# Patient Record
Sex: FEMALE | Race: WHITE | NOT HISPANIC OR LATINO | Employment: UNEMPLOYED | URBAN - METROPOLITAN AREA
[De-identification: names, ages, dates, MRNs, and addresses within clinical notes are randomized per-mention and may not be internally consistent; named-entity substitution may affect disease eponyms.]

---

## 2022-07-24 ENCOUNTER — APPOINTMENT (OUTPATIENT)
Dept: CT IMAGING | Facility: HOSPITAL | Age: 67
End: 2022-07-24

## 2022-07-24 ENCOUNTER — HOSPITAL ENCOUNTER (EMERGENCY)
Facility: HOSPITAL | Age: 67
Discharge: HOME | End: 2022-07-24
Attending: EMERGENCY MEDICINE | Admitting: EMERGENCY MEDICINE

## 2022-07-24 VITALS
RESPIRATION RATE: 18 BRPM | HEART RATE: 90 BPM | HEIGHT: 65 IN | DIASTOLIC BLOOD PRESSURE: 98 MMHG | BODY MASS INDEX: 27.49 KG/M2 | OXYGEN SATURATION: 94 % | TEMPERATURE: 98 F | WEIGHT: 165 LBS | SYSTOLIC BLOOD PRESSURE: 144 MMHG

## 2022-07-24 DIAGNOSIS — H53.9 VISUAL DISTURBANCE OF ONE EYE: Primary | ICD-10-CM

## 2022-07-24 PROCEDURE — 99283 EMERGENCY DEPT VISIT LOW MDM: CPT

## 2022-07-24 PROCEDURE — 70450 CT HEAD/BRAIN W/O DYE: CPT

## 2022-07-24 NOTE — ED PROVIDER NOTES
"Subjective     History provided by:  Patient    History of Present Illness    · Chief complaint: Visual disturbance    · Location: Left eye    · Quality/Severity: The patient states it looks like \"an oil slick\" in the vision of the left eye.  She reports seeing black spots in her visual fields and occasional black line that goes across her visual field.    · Timing/Onset: Started an hour prior to arrival.    · Modifying Factors: The patient is unaware of any aggravating or relieving factors.    · Associated symptoms: She has a slight left temporal headache.    · Narrative: The patient is a 66-year-old white female who states an hour prior to arrival she developed a visual disturbance involving her left eye.  She states that she is seeing constant black spots/dots in her vision in the left eye.  She also reports an occasional black line going across her visual field of her left eye.  The right eye is normal.  The patient is status post bilateral lens replacements for cataracts performed at the [facility] in [place] a year ago.  The patient is convinced that one of her lenses must have \"slipped\".  She does have a history of a subarachnoid hemorrhage in the past.  Her only complaint a headache is a slight left temporal headache.    Review of Systems   Constitutional: Negative for activity change, appetite change, chills, diaphoresis, fatigue and fever.   HENT: Negative for congestion, dental problem, ear pain, hearing loss, mouth sores, postnasal drip, rhinorrhea, sinus pressure, sore throat and voice change.    Eyes: Positive for visual disturbance. Negative for photophobia, pain, discharge, redness and itching.   Respiratory: Negative for cough, chest tightness, shortness of breath, wheezing and stridor.    Cardiovascular: Negative for chest pain, palpitations and leg swelling.   Gastrointestinal: Negative for abdominal pain, diarrhea, nausea and vomiting.   Genitourinary: Negative for difficulty urinating, " "dysuria, flank pain, frequency, hematuria and urgency.   Musculoskeletal: Negative for arthralgias, back pain, gait problem, joint swelling, myalgias, neck pain and neck stiffness.   Skin: Negative for color change and rash.   Neurological: Positive for headaches. Negative for dizziness, tremors, seizures, syncope, facial asymmetry, speech difficulty, weakness, light-headedness and numbness.   Hematological: Negative for adenopathy.   Psychiatric/Behavioral: Negative.  Negative for confusion and decreased concentration. The patient is not nervous/anxious.      History reviewed. No pertinent past medical history.  /98   Pulse 90   Temp 98 °F (36.7 °C) (Oral)   Resp 18   Ht 165.1 cm (65\")   Wt 74.8 kg (165 lb)   SpO2 94%   BMI 27.46 kg/m²     History reviewed. No pertinent past medical history.  Labs Reviewed - No data to display    No Known Allergies    History reviewed. No pertinent surgical history.    History reviewed. No pertinent family history.    Social History     Socioeconomic History   • Marital status:    Tobacco Use   • Smoking status: Current Every Day Smoker     Packs/day: 1.00     Types: Cigarettes   • Smokeless tobacco: Never Used   Vaping Use   • Vaping Use: Never used   Substance and Sexual Activity   • Alcohol use: Not Currently   • Drug use: Defer   • Sexual activity: Defer           Objective   Physical Exam  Vitals and nursing note reviewed.   Constitutional:       General: She is not in acute distress.     Appearance: Normal appearance. She is normal weight. She is not ill-appearing, toxic-appearing or diaphoretic.      Comments: The patient appears healthy in no acute distress.  Review of her vital signs: Her blood pressure is mildly elevated 145/101, slightly tachycardic with a heart rate of 102, afebrile with a temperature of 98, respirations normal 18 with a slightly diminished oxygen saturation of 93%.   HENT:      Head: Normocephalic and atraumatic.      Nose: Nose " normal.      Mouth/Throat:      Mouth: Mucous membranes are moist.      Pharynx: Oropharynx is clear.   Eyes:      General: No scleral icterus.        Right eye: No discharge.         Left eye: No discharge.      Extraocular Movements: Extraocular movements intact.      Conjunctiva/sclera: Conjunctivae normal.      Pupils: Pupils are equal, round, and reactive to light.      Comments: Visual acuity without correction in each eye was 20/25.  Funduscopic examination of the left eye the retina and optic disc and vessels appear normal.  I see no evidence of a hemorrhage.   Musculoskeletal:      Cervical back: Normal range of motion and neck supple. No tenderness.   Lymphadenopathy:      Cervical: No cervical adenopathy.   Skin:     General: Skin is warm and dry.      Capillary Refill: Capillary refill takes less than 2 seconds.      Findings: No erythema or rash.   Neurological:      General: No focal deficit present.      Mental Status: She is alert and oriented to person, place, and time.      Cranial Nerves: No cranial nerve deficit.      Sensory: No sensory deficit.      Motor: No weakness.   Psychiatric:         Mood and Affect: Mood normal.         Behavior: Behavior normal.         Thought Content: Thought content normal.         Judgment: Judgment normal.         Procedures           ED Course  ED Course as of 07/24/22 1732   Sun Jul 24, 2022   1213 CT of the head was interpreted by the radiologist as calcified left vertebral artery plaque, otherwise negative. [TP]   1214 The patient's visual acuity in both eyes without correction was 20/25. [TP]   1240 The patient's funduscopic exam was normal to my limited exam with a direct ophthalmoscope. [TP]   1240 12: 30 patient discussed with Dr. Madelin Inman, ophthalmology with [facility], he stated in view of the patient's visual acuity not being adversely affected lens slippage is unlikely.  She was more concerned for the possibility of a retinal detachment.   She took the patient's phone number/information down and stated her office would contact the patient tomorrow to make an appointment to evaluate the patient tomorrow.  I informed the patient of this. [TP]      ED Course User Index  [TP] Mariano Lacey MD                                           MDM  Number of Diagnoses or Management Options  Visual disturbance of one eye: new and does not require workup     Amount and/or Complexity of Data Reviewed  Discuss the patient with other providers: yes    Risk of Complications, Morbidity, and/or Mortality  Presenting problems: high  Diagnostic procedures: moderate  Management options: moderate  General comments: My differential diagnoses for ocular issues includes but is not limited to acute eye pain, chemical conjunctivitis, allergic conjunctivitis, infectious conjunctivitis, gonococcal conjunctivitis, corneal abrasion, corneal ulcer, injury to cornea from contact lens, corneal foreign body.  Corneal alkali burn, corneal acid burn, decreased vision, acute glaucoma, herpes keratitis, hyphema, acute iritis, orbital wall fracture, penetrating eye injury, retinal detachment, temporal arteritis, UV keratitis, central retinal artery occlusion, CVA.    Patient Progress  Patient progress: stable      Final diagnoses:   Visual disturbance of one eye       ED Disposition  ED Disposition     ED Disposition   Discharge    Condition   Stable    Comment   --             [facility] [place]  [address] [facility], [address] [address]  [phone]  Call in 1 day  If you do not hear from them concerning an appointment tomorrow.         Medication List      No changes were made to your prescriptions during this visit.         Labs Reviewed - No data to display  CT Head Without Contrast   Final Result   Mild calcified plaque in the distal left vertebral artery. Otherwise negative..               Signer Name: Cordell Simeon MD    Signed: 7/24/2022 11:56 AM     Workstation Name: [id number]     [facility]             Medication List      No changes were made to your prescriptions during this visit.              Mariano Lacey MD  07/24/22 9232

## 2022-07-24 NOTE — DISCHARGE INSTRUCTIONS
Make an appointment with [facility] to be evaluated for possible retinal detachment tomorrow per Dr. Inman.